# Patient Record
Sex: FEMALE | Race: WHITE | NOT HISPANIC OR LATINO | URBAN - METROPOLITAN AREA
[De-identification: names, ages, dates, MRNs, and addresses within clinical notes are randomized per-mention and may not be internally consistent; named-entity substitution may affect disease eponyms.]

---

## 2023-04-17 ENCOUNTER — HOSPITAL ENCOUNTER (OUTPATIENT)
Dept: DATA CONVERSION | Facility: HOSPITAL | Age: 38
End: 2023-04-17
Attending: SURGERY | Admitting: SURGERY

## 2023-04-17 DIAGNOSIS — G51.0 BELL'S PALSY: ICD-10-CM

## 2023-04-17 DIAGNOSIS — K56.1 INTUSSUSCEPTION (MULTI): ICD-10-CM

## 2023-04-17 DIAGNOSIS — F17.200 NICOTINE DEPENDENCE, UNSPECIFIED, UNCOMPLICATED: ICD-10-CM

## 2023-04-17 DIAGNOSIS — F90.9 ATTENTION-DEFICIT HYPERACTIVITY DISORDER, UNSPECIFIED TYPE: ICD-10-CM

## 2023-04-21 LAB
COMPLETE PATHOLOGY REPORT: NORMAL
CONVERTED CLINICAL DIAGNOSIS-HISTORY: NORMAL
CONVERTED FINAL DIAGNOSIS: NORMAL
CONVERTED FINAL REPORT PDF LINK TO COPY AND PASTE: NORMAL
CONVERTED GROSS DESCRIPTION: NORMAL

## 2023-09-14 NOTE — H&P
History & Physical Reviewed:   Pregnant/Lactating:  ·  Are You Pregnant no   ·  Are You Currently Breastfeeding no     I have reviewed the History and Physical dated:  28-Mar-2023   History and Physical reviewed and relevant findings noted. Patient examined to review pertinent physical  findings.: No significant changes   Home Medications Reviewed: no changes noted   Allergies Reviewed: no changes noted       ERAS (Enhanced Recovery After Surgery):  ·  ERAS Patient: yes   ·  CPM/PAT Utilization: no   ·  Immunonutrition Recovery Drink Utilization: no   ·  Carbohydrate Supplement Drink Utilization: no     Consent:   COVID-19 Consent:  ·  COVID-19 Risk Consent Surgeon has reviewed key risks related to the risk of davey COVID-19 and if they contract COVID-19 what the risks are.       Electronic Signatures:  Jose R Bravo)  (Signed 17-Apr-2023 12:01)   Authored: History & Physical Reviewed, ERAS, Consent,  Note Completion      Last Updated: 17-Apr-2023 12:01 by Jose R Bravo)

## 2023-10-02 NOTE — OP NOTE
PROCEDURE DETAILS    Preoperative Diagnosis:  Intussusception, acquired, K56.1  null, null    Postoperative Diagnosis:  Intussusception, acquired, K56.1  null, null    Surgeon: Jose R Bravo  Resident/Fellow/Other Assistant: Chad Schmid    Procedure:  1. DIAGNOSTIC LAPAROSCOPY  2. EGD WITH BIOPSY    Anesthesia: Alverto Gilbert  Estimated Blood Loss: 0 cc  Findings: Visible intussusception without any lead point, or gross lesions at 140 cm from LOT. Some adhesions between the perigastric omentum and left abdominal wall. 650 cm of small bowel.  Specimens(s) Collected: yes,  EGD biopsies.   IV Fluids: See OR Record        Operative Report:   During the preoperative huddle the patient's identifiers, consent, operation details, and equipment was verified. The patient was taken to the operating room  and then placed in the supine position with the left arm tucked and pressure points were padded. Sequential compression stockings were placed and general endotracheal anesthesia was administered. A fong was placed. An OG was placed but removed during  endoscopy. The patient was then prepped and draped in the usual sterile fashion. Prophylactic antibiotics were administered or continued. A surgical timeout was then performed confirming the correct patient, procedure, position, equipment, and any necessary  operative implants.     Access to the abdomen was gained through a left midclavicular incision and utilized a 0° 5 mm laparoscope with an Optiview trocar. The abdomen was insufflated to a pressure of 15 mmHg after peritoneal access was obtained and there was no injury to  luminal organs or surrounding structures visualized. A 30 degree scope was then used for the remainder of the case. A 5 mm port was then placed at left lateral abdomen and left pelvis.     The small bowel was run from the ligament of Treitz distally. At 140 cm there was an active evolving intussusception with only about 1 cm inside which  spontaneously reduced. The bowel was hypercontractile distally and dilated proximally. No hyperemia  or inflammation of the bowel was noted. There were no adhesions, masses, thickened small bowel wall, or grossly palpated internal lesions. This area was left alone. The mesentery was thin and all vessels were visible in it. The remainder of the bowel  was normal in caliber distally except the last 50 cm which were again fluid filled. The entire 650 cm of small bowel were run again from cecum to LOT and the same area of gentle transition was noted at 140 cm, again without any gross lesions. Nothing  was resected as a result. The field was hemostatic. There was a small bit of scar tissue between the left gastric omentum and the abdominal wall. The stomach appeared normal. The abdomen was desufflated under direct visualization. All ports were removed.  All counts were correct. The port sites were closed with a 4-0 Monocryl in the subcuticular plane. Dermabond was applied.    An EGD was performed and dictated elsewhere in provation with photos. There was dilated duodenum and duodenal bulb with little feathered mucosal appearance. The lumen was wide and unstructured. The bowel just collapsed on itself easily. There were focal  areas of gastropathy which were biopsied. There was Grade A esophagitis locally on the left side of the distal esophagus for 2 cm above the z-line. I reviewed the images with Dr. Bonilla.    The patient tolerated the procedure well. The fong was removed. The patient was transported to the PACU in stable condition. I was present and  scrubbed for all critical portions of the case.                        Attestation:   Note Completion:  Attending Attestation I performed the procedure without a resident         Electronic Signatures:  Jose R Bravo)  (Signed 17-Apr-2023 10:59)   Authored: Post-Operative Note, Chart Review, Note Completion      Last Updated: 17-Apr-2023 10:59 by Shelly  Jose R ECHEVERRIA)